# Patient Record
Sex: MALE | Race: OTHER | URBAN - METROPOLITAN AREA
[De-identification: names, ages, dates, MRNs, and addresses within clinical notes are randomized per-mention and may not be internally consistent; named-entity substitution may affect disease eponyms.]

---

## 2021-03-25 ENCOUNTER — EMERGENCY (EMERGENCY)
Facility: HOSPITAL | Age: 31
LOS: 1 days | Discharge: HOME | End: 2021-03-25
Admitting: EMERGENCY MEDICINE
Payer: SELF-PAY

## 2021-03-25 VITALS
HEART RATE: 90 BPM | WEIGHT: 160.06 LBS | RESPIRATION RATE: 18 BRPM | DIASTOLIC BLOOD PRESSURE: 75 MMHG | SYSTOLIC BLOOD PRESSURE: 135 MMHG | OXYGEN SATURATION: 98 % | TEMPERATURE: 99 F | HEIGHT: 62.99 IN

## 2021-03-25 DIAGNOSIS — Z79.1 LONG TERM (CURRENT) USE OF NON-STEROIDAL ANTI-INFLAMMATORIES (NSAID): ICD-10-CM
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
## 2021-03-25 DIAGNOSIS — Z79.811 LONG TERM (CURRENT) USE OF AROMATASE INHIBITORS: ICD-10-CM
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
## 2021-03-25 DIAGNOSIS — M54.5 LOW BACK PAIN: ICD-10-CM

## 2021-03-25 DIAGNOSIS — M54.42 LUMBAGO WITH SCIATICA, LEFT SIDE: ICD-10-CM

## 2021-03-25 PROCEDURE — 72100 X-RAY EXAM L-S SPINE 2/3 VWS: CPT | Mod: 26

## 2021-03-25 PROCEDURE — 96372 THER/PROPH/DIAG INJ SC/IM: CPT

## 2021-03-25 PROCEDURE — 99284 EMERGENCY DEPT VISIT MOD MDM: CPT

## 2021-03-25 PROCEDURE — 72100 X-RAY EXAM L-S SPINE 2/3 VWS: CPT

## 2021-03-25 PROCEDURE — 99283 EMERGENCY DEPT VISIT LOW MDM: CPT | Mod: 25

## 2021-03-25 RX ORDER — KETOROLAC TROMETHAMINE 30 MG/ML
30 SYRINGE (ML) INJECTION ONCE
Refills: 0 | Status: DISCONTINUED | OUTPATIENT
Start: 2021-03-25 | End: 2021-03-25

## 2021-03-25 RX ORDER — METHOCARBAMOL 500 MG/1
1 TABLET, FILM COATED ORAL
Qty: 14 | Refills: 0
Start: 2021-03-25 | End: 2021-03-31

## 2021-03-25 RX ORDER — LIDOCAINE 4 G/100G
1 CREAM TOPICAL ONCE
Refills: 0 | Status: COMPLETED | OUTPATIENT
Start: 2021-03-25 | End: 2021-03-25

## 2021-03-25 RX ORDER — IBUPROFEN 200 MG
1 TABLET ORAL
Qty: 21 | Refills: 0
Start: 2021-03-25 | End: 2021-03-31

## 2021-03-25 RX ORDER — METHOCARBAMOL 500 MG/1
750 TABLET, FILM COATED ORAL ONCE
Refills: 0 | Status: COMPLETED | OUTPATIENT
Start: 2021-03-25 | End: 2021-03-25

## 2021-03-25 RX ADMIN — LIDOCAINE 1 PATCH: 4 CREAM TOPICAL at 09:17

## 2021-03-25 RX ADMIN — Medication 30 MILLIGRAM(S): at 09:18

## 2021-03-25 RX ADMIN — METHOCARBAMOL 750 MILLIGRAM(S): 500 TABLET, FILM COATED ORAL at 09:18

## 2021-03-25 NOTE — ED ADULT TRIAGE NOTE - CHIEF COMPLAINT QUOTE
x 2 weeks ago pt reports hurting his back at work lifting heavy a heavy object. reports today the pain worsened. denies numbness, tingling, weakness, loss of bowel/ bladder movements.

## 2021-03-25 NOTE — ED PROVIDER NOTE - NSFOLLOWUPINSTRUCTIONS_ED_ALL_ED_FT
LOW BACK STRAIN - General Information           Distensión lumbar    LO QUE NECESITA SABER:    ¿Qué es la distensión lumbar?La distensión lumbar es brigida lesión en los músculos o tendones de la parte inferior de la espalda. Los tendones son los tejidos tyree que conectan a los músculos con los huesos. La parte inferior de la espalda sostiene la mayor parte de kidd peso corporal y facilita kidd habilidad de moverse, torcerse y doblarse.    ¿Qué ocasiona la distensión lumbar?La distensión lumbar por lo general es provocada por actividades que aumentan la tensión en la parte inferior de la espalda manas el ejercicio o brigida lesión. Los siguientes podrían aumentar kidd riesgo para brigida distensión lumbar:   •Usted sufrió anteriormente brigida distensión lumbar.      •Usted levanta objetos pesados con kidd espalda en vez de usar ana maría piernas.      •Usted no hace calentamiento antes de hacer ejercicio.      •Usted pasa mucho tiempo parado o sentado.      •Usted tiene sobrepeso.      ¿Cuáles son los signos y síntomas de la distensión lumbar?  •Dolor en la parte inferior de la espalda o espasmos musculares      •Rigidez o movimiento limitado      •Dolor que se desplaza hacia los glúteos, eliana o las piernas      •Dolor que empeora con la actividad      ¿Cómo se diagnostica la distensión lumbar?Podría realizarse brigida radiografía, brigida tomografía computarizada (TC) o brigida imagen por resonancia magnética (IRM) para revisar si hay daño en kidd columna, músculos o tendones. Es posible que le administren líquido de contraste para que la parte inferior de kidd espalda se farnaz mejor en las imágenes. Dígale al médico si usted alguna vez ha tenido brigida reacción alérgica al líquido de contraste. No entre a la elin donde se realiza la resonancia magnética con algo de metal. El metal puede causar lesiones serias. Dígale al médico si usted tiene algo de metal dentro de kidd cuerpo o por encima.    ¿Cómo se trata la distensión lumbar?  •Acetaminofénalivia el dolor y baja la fiebre. Está disponible sin receta médica. Pregunte la cantidad y la frecuencia con que debe tomarlos. Siga las indicaciones. El acetaminofén puede causar daño en el hígado cuando no se nighat de forma correcta.      •Los MELANIE,manas el ibuprofeno, ayudan a disminuir la inflamación, el dolor y la fiebre. Heidy medicamento está disponible con o sin brigida receta médica. Los MELANIE pueden causar sangrado estomacal o problemas renales en ciertas personas. Si usted nighat un medicamento anticoagulante, siempre pregúntele a kidd médico si los MELANIE son seguros para usted. Siempre jeremias la etiqueta de heidy medicamento y siga las instrucciones.      •Relajantes muscularesayudan a reducir dolor y espasmos musculares.      •Puede administrarsepodrían administrarse. Pregunte cómo alex estos medicamentos de brigida forma pereyra.      •La cirugíaPodría necesitarse brigida cirugíasi kidd distensión es severa.      ¿Cómo puedo controlar los síntomas?  •El descansosegún las indicaciones. Es probable que necesite descansar en cama tre un tiempo después de lesionarse. No levante objetos pesados.      •Aplique hieloen la espalda de 15 a 20 minutos cada hora o manas se le indique. Use brigida compresa de hielo o ponga hielo triturado en brigida bolsa de plástico. Cúbrala con brigida toalla. El hielo ayuda a evitar daño al tejido y a disminuir la inflamación y el dolor.      •La aplicación de caloren la parte inferior de la espalda de 20 a 30 minutos cada 2 horas tre los días que le indiquen. El calor ayuda a disminuir el dolor y los espasmos musculares.      •Comience lentamente a aumentar kidd nivel de actividadconforme disminuya el dolor o manas se lo indiquen.      ¿Cómo se puede evitar la distensión lumbar?  •Use movimientos corporales correctos.?Flexione la cadera y las rodillas cuando vaya a levantar un objeto. No doble la cintura. Utilice los músculos de las piernas mientras levanta kidd carga. No use kidd espalda. Mantenga el objeto cerca de kidd pecho mientras lo levanta. No se tuerza, ni levante cualquier cosa por encima de kidd cintura.      ?Cambie kidd posición frecuentemente cuando pase mucho tiempo de pie. Descanse un pie sobre brigida caja pequeña o un reposapiés e intercambie con el otro pie frecuentemente.      ?No permanezca sentado por lapsos de tiempo prolongados. Cuando sea necesario hacerlo, siéntese en brigida silla de respaldo recto con los pies apoyados en el suelo.      ?Nunca alcance, jale ni empuje mientras se encuentra sentando.      •Entre en calor antes de hacer ejercicio.Ernesto ejercicios que fortalezcan ana maría músculos de la espalda. Pregunte a kidd médico acerca del mejor plan de ejercicio para usted.      •Mantenga un peso saludable.Consulte con kdid médico cuánto debería pesar. Pida que le ayude a crear un plan para bajar de peso si usted tiene sobrepeso.      ¿Cuándo josh buscar atención inmediata?  •Usted oye o siente un estallido en la parte inferior de la espalda.      •Usted tiene mayor inflamación o dolor en la parte inferior de kidd espalda.      •Usted tiene dificultad para  las piernas.      •Ana María piernas están entumecidas.      ¿Cuándo josh comunicarme con mi médico?  •Tiene fiebre.      •El dolor no desaparece, incluso después del tratamiento.      •Usted tiene preguntas o inquietudes acerca de kidd condición o cuidado.      ACUERDOS SOBRE KIDD CUIDADO:    Usted tiene el derecho de ayudar a planear kidd cuidado. Aprenda todo lo que pueda sobre kidd condición y manas darle tratamiento. Discuta ana maría opciones de tratamiento con ana maría médicos para decidir el cuidado que usted desea recibir. Usted siempre tiene el derecho de rechazar el tratamiento.                         SCIATICA - General Information           Ciática    LO QUE NECESITA SABER:    ¿Qué es la ciática?La ciática es brigida condición que causa dolor en el nervio ciático. El nervio ciático sale de la imelda dorsal y corre por ambos lados de los glúteos. Luego baja por la parte posterior del muslo, la parte inferior de la pierna y el pie. El nervio ciático puede estar comprimido, inflamado, irritado o estirado en alguna parte y causar síntomas.    ¿Qué causa la ciática?La ciática puede estar relacionada con ciertas actividades, janneth postura y tensión física o psicológica. Cualquiera de los factores siguientes puede causar o incrementar kidd riesgo de tener ciática:   •Problema con los discos:La causa más común de la ciática es brigida hernia de disco (tejido acolchado que se encuentra entre los huesos de la columna). Es posible que el disco ponga presión en el nervio ciático. Puede que un hueso de la columna se deslice sobre otro, o que se haya estrechado el espacio en los huesos de la columna.      •Lesión muscular:Brea puede suceder después de torcerse o levantar un objeto pesado. La inflamación que resulta del esguince o irritación muscular en los glúteos, los muslos o las piernas pone presión en el nervio ciático.      •Obesidad o embarazo:El exceso de peso pone más presión en la espalda y las piernas.      •Trauma:Los golpes directos en los glúteos, los muslos o las piernas, los accidentes automovilísticos o las caídas pueden lesionar el nervio ciático.      •Enfermedades de la columna vertebral:La artritis, osteoporosis, cáncer o infección de la columna también pueden afectar el nervio ciático.      ¿Cuáles son los signos y síntomas de la ciática?La ciática puede tener síntomas a corto o a jeanie plazo:  •Dolor que comienza en la parte inferior de la espalda y baja por los glúteos y la parte posterior del muslo      •Pérdida de la sensación u hormigueo en los glúteos y las piernas      •Debilidad muscular, dificultad para moverse o para controlar la pierna o el pie      •Dolor en las piernas que aumenta cuando está de pie, sentado o en cuclillas      ¿Cómo se diagnostica la ciática?Kidd médico le preguntará sobre ana maría otras condiciones médicas. Puede que le pregunte acerca de kidd trabajo, kidd historial de dolor en la espalda y las enfermedades o cirugías que ha tenido. Lo examinará y le moverá las piernas para comprobar qué hace que el dolor aumente. Es posible que también necesite alguno de los siguientes tratamientos:  •Radiografía:Son imágenes de los huesos y los tejidos de kidd espalda, caderas, muslos o piernas. Es posible que esta prueba muestre otros problemas, manas fracturas (huesos quebrados).      •Tomografía computarizada:Heidy examen también se conoce manas escán TAC. Brigida máquina de jeff x utiliza brigida computadora para alex imágenes de ana maría caderas, muslos y piernas. Puede que las imágenes muestren kidd nervio ciático, músculos y vasos sanguíneos. Es posible que le administren un medio de contraste antes de alex las imágenes para que los médicos las puedan shari con mayor claridad. Dígale al médico si usted alguna vez ha tenido brigida reacción alérgica al tinte de contraste.      •Imágenes por resonancia magnética (IRM):En heidy estudio se utilizan imanes potentes y brigida computadora para alex imágenes de las caderas, muslos y piernas. Puede que la IRM muestre si se ha dañado los nervios, músculos, huesos y vasos sanguíneos. Le podrían administrar un tinte para ayudar a que las imágenes se vean mejor. Dígale al médico si usted alguna vez ha tenido brigida reacción alérgica al tinte de contraste. No entre a la elin donde se realiza la resonancia magnética con algo de metal. El metal puede causar lesiones serias. Dígale al médico si usted tiene algo de metal dentro de kidd cuerpo o por encima.      •Brigida electromiografía (EMG)es un examen que mide la actividad eléctrica de ana maría músculos en descanso y en movimiento.      •Pruebas de conducción nerviosa:Estos estudios comprueban la manera en que los nervios superficiales y los músculos relacionados reaccionan a la estimulación. Se colocan electrodos con cables o agujas diminutas en ciertas áreas, manas los glúteos y las piernas.      ¿Cuál es el tratamiento para la ciática?  •AINEs (analgésicos antiinflamatorios no esteroides):Estos medicamentos disminuyen la inflamación y el dolor. Los AINEs se pueden obtener sin receta médica. Consulte con kidd médico cuál medicamento es el adecuado para usted. Pregunte cuánto alex y cuándo. Tómelos manas se le indique. Cuando no se lewis de la manera indicada, los medicamentos antiinflamatorios no esteroides pueden causar sangrado estomacal o problemas renales.      •Acetaminofeno:Heidy medicamento disminuye el dolor. El acetaminofeno puede obtener sin receta médica. Pregunte la cantidad y la frecuencia con que debe tomarlos. Siga las indicaciones. El acetaminofén puede causar daño en el hígado cuando no se nighat de forma correcta.      •Relajantes muscularesayudan a reducir dolor y espasmos musculares.      •Medicamento esteroide epidural:Puede incluir tanto un anestésico (medicamento para adormecer) manas un esteroide, que puede bajar la inflamación y calmar el dolor. Se administra en forma de inyección cerca de la imelda dorsal, en el área donde siente el dolor.      •Quimionucleólisis:Se administra brigida inyección en el disco afectado para ablandarlo o encogerlo.      •Cirugía:Es posible que se realice brigida cirugía para corregir problemas manas un disco dañado o un tumor en la columna. Se puede realizar para disminuir la presión en el nervio ciático. Los médicos también pueden liberar los músculos que están presionando el nervio ciático.      ¿Cómo puedo ayudar a controlar la ciática?  •Terapia de ultrasonido:Brigida máquina emplea ondas sonoras para aliviar el dolor. Es posible que se use además un medicamento tópico para contribuir a calmar el dolor y la inflamación.      •Fisioterapia:Un fisioterapeuta le puede enseñar ejercicios para ayudarle a mejorar el movimiento y la fuerza, y para disminuir el dolor. Un terapeuta ocupacional le enseña habilidades para ayudarlo con ana maría actividades diarias.      •Dispositivos de asistencia:Es posible que deba usar un soporte para la espalda, manas brigida faja. Puede que necesite muletas, un bastón o un andador para disminuir la presión en los músculos de la parte inferior de la espalda y las piernas. Pregunte a kidd médico por más información sobre los dispositivos de asistencia y cómo se usan de forma correcta.      ¿Cómo se puede prevenir la ciática?  •Evite la presión en la espalda y las piernas:No  levante objetos pesados, ni esté de pie o sentado por períodos prolongados de tiempo.      •Levante los objetos de manera pereyra:Mantenga la espalda derecha y doble las rodillas para alzar un objeto. No doble ni tuerza la espalda cuando levante algo.      •Mantenga un peso saludable:Consulte con kidd médico cuánto debería pesar. Pida que le ayude a crear un plan para bajar de peso si usted tiene sobrepeso.      •Ejercicio:Pídale a kidd médico que le recomiende el programa de estiramiento, calentamiento y ejercicio más apropiado para usted.      ¿Cuáles son los riesgos de la ciática?Si no se administra correctamente, la inyección epidural de esteroide puede resultar en trastornos de dolor o parálisis. También puede causar dolor de sharri, dolor en la pierna y bloqueo del flujo sanguíneo a la médula mccray. Puede sangrar o contraer brigida infección manas resultado de la cirugía. Si no recibe tratamiento, ana maría músculos y nervios se podrían dañar de forma permanente. Es posible que tenga menos fuerza. Es posible que no pueda  la pierna o controlar cuándo orina o evacua los intestinos.    ¿Cuándo josh comunicarme con mi médico?  •Le duele la parte inferior de la espalda por la noche o cuando descansa.      •Le duele la parte inferior de la espalda y se le adormece la pierna por debajo de la rodilla.      •Tiene debilidad en brigida pierna solamente.      •Usted tiene preguntas o inquietudes acerca de kidd condición o cuidado.      ¿Cuándo josh buscar atención inmediata o llamar al 911?  •Tiene dificultad para contener la orina o las evacuaciones intestinales.      •Tiene debilidad en ambas piernas.      •Pierde la sensación en la eliana o los glúteos.      ACUERDOS SOBRE KIDD CUIDADO:    Usted tiene el derecho de ayudar a planear kidd cuidado. Aprenda todo lo que pueda sobre kidd condición y manas darle tratamiento. Discuta ana maría opciones de tratamiento con ana maría médicos para decidir el cuidado que usted desea recibir. Usted siempre tiene el derecho de rechazar el tratamiento.

## 2021-03-25 NOTE — ED ADULT NURSE NOTE - OBJECTIVE STATEMENT
pt is a 31 y/o M arriving to ED for c/c lower back pain. per pt, x 2 weeks ago pt reports hurting his back at work lifting heavy object. reports worsening pain over past ~3days. Endorses radiating pain to Lt leg. denies numbness, tingling, weakness, loss of bowel/ bladder movements.

## 2021-03-25 NOTE — ED PROVIDER NOTE - CARE PROVIDER_API CALL
Miguelangel Mota)  Orthopedics  [address] [address]  Phone: [phone]  Fax: [phone]  Follow Up Time:

## 2021-03-25 NOTE — ED PROVIDER NOTE - PATIENT PORTAL LINK FT
You can access the FollowMyHealth Patient Portal offered by [facility] by registering at the following website: [email or web address]. By joining [employer]’s FollowMyHealth portal, you will also be able to view your health information using other applications (apps) compatible with our system.

## 2021-03-25 NOTE — ED PROVIDER NOTE - OBJECTIVE STATEMENT
31 y/o M with no PMHx presents to the ED c/o lower back pain x 2 weeks, worsening today. Pt reports he injured his back while lifting a heavy object at work 2 weeks ago, and regularly lifts heavy objects at work. Reports pain more to the L side of his back, radiates down the L leg, and is worse with movement. Denies any direct fall, any fall of objects onto his back, abdominal pain, suprapubic pain, changes in urination, pain with urination, diarrhea, or constipation. 31 y/o M with no PMHx presents to the ED c/o lower back pain x 2 weeks, worsening today. Pt reports he injured his back while lifting a heavy object at work 2 weeks ago. Pt states he regularly lifts heavy objects at work. Reports pain more to the L side of his back, radiates down the L leg, and is worse with movement. Denies any direct fall, any fall of objects onto his back, abdominal pain, suprapubic pain, changes in urination, pain with urination, diarrhea, or constipation.

## 2021-03-25 NOTE — ED PROVIDER NOTE - MUSCULOSKELETAL, MLM
Spine appears normal, no midline spinal tenderness, + tenderness to the L paraspinal region, + straight leg raise, no ttp to b/l hip joints, FROM of all extremities, normal gait.

## 2021-03-25 NOTE — ED PROVIDER NOTE - CLINICAL SUMMARY MEDICAL DECISION MAKING FREE TEXT BOX
29 y/o generally healthy M presents to the ED c/o back injury while at work. Pain radiates down the L posterior leg. Clinical presentation suspicious for sciatica-type pain or back strain. Pt insists on XR. Anticipate dc home w/ muscle relaxants and antiinflammatories, for outpt follow up w/ ortho spine.

## 2021-03-25 NOTE — ED PROVIDER NOTE - CHPI ED SYMPTOMS NEG
no fall, no fall of heavy objects onto back, no abdominal pain, no suprapubic pain, no pain w/ urination, no diarrhea/no bladder dysfunction/no constipation

## 2021-03-29 PROBLEM — Z00.00 ENCOUNTER FOR PREVENTIVE HEALTH EXAMINATION: Status: ACTIVE | Noted: 2021-03-29
